# Patient Record
Sex: FEMALE | Race: BLACK OR AFRICAN AMERICAN | HISPANIC OR LATINO | URBAN - METROPOLITAN AREA
[De-identification: names, ages, dates, MRNs, and addresses within clinical notes are randomized per-mention and may not be internally consistent; named-entity substitution may affect disease eponyms.]

---

## 2023-08-30 ENCOUNTER — OFFICE VISIT (OUTPATIENT)
Dept: OBGYN CLINIC | Facility: CLINIC | Age: 43
End: 2023-08-30

## 2023-08-30 VITALS
BODY MASS INDEX: 27.88 KG/M2 | HEART RATE: 73 BPM | SYSTOLIC BLOOD PRESSURE: 112 MMHG | HEIGHT: 60 IN | WEIGHT: 142 LBS | DIASTOLIC BLOOD PRESSURE: 72 MMHG

## 2023-08-30 DIAGNOSIS — Z12.4 CERVICAL CANCER SCREENING: ICD-10-CM

## 2023-08-30 DIAGNOSIS — N94.9 VAGINAL BURNING: ICD-10-CM

## 2023-08-30 DIAGNOSIS — Z12.39 ENCOUNTER FOR SCREENING FOR MALIGNANT NEOPLASM OF BREAST, UNSPECIFIED SCREENING MODALITY: ICD-10-CM

## 2023-08-30 DIAGNOSIS — Z01.419 ENCOUNTER FOR GYNECOLOGICAL EXAMINATION (GENERAL) (ROUTINE) WITHOUT ABNORMAL FINDINGS: Primary | ICD-10-CM

## 2023-08-30 PROCEDURE — G0476 HPV COMBO ASSAY CA SCREEN: HCPCS | Performed by: OBSTETRICS & GYNECOLOGY

## 2023-08-30 PROCEDURE — G0124 SCREEN C/V THIN LAYER BY MD: HCPCS | Performed by: PATHOLOGY

## 2023-08-30 PROCEDURE — 99386 PREV VISIT NEW AGE 40-64: CPT | Performed by: OBSTETRICS & GYNECOLOGY

## 2023-08-30 PROCEDURE — G0145 SCR C/V CYTO,THINLAYER,RESCR: HCPCS | Performed by: PATHOLOGY

## 2023-08-31 LAB
HPV HR 12 DNA CVX QL NAA+PROBE: NEGATIVE
HPV16 DNA CVX QL NAA+PROBE: NEGATIVE
HPV18 DNA CVX QL NAA+PROBE: NEGATIVE

## 2023-09-08 LAB
LAB AP GYN PRIMARY INTERPRETATION: ABNORMAL
Lab: ABNORMAL
PATH INTERP SPEC-IMP: ABNORMAL

## 2023-10-24 ENCOUNTER — TELEPHONE (OUTPATIENT)
Dept: OBGYN CLINIC | Facility: CLINIC | Age: 43
End: 2023-10-24

## 2023-10-24 NOTE — TELEPHONE ENCOUNTER
----- Message from Suzanne Hamilton sent at 10/10/2023  8:40 AM EDT -----  Can you please let the pt know. Thank you!   ----- Message -----  From: Carolina Aviles MD  Sent: 9/22/2023   9:15 AM EDT  To: Suzanne Hamilton    Hi    Her pap is ASCUS with NEGATIVE HPV so she will need a pap in 3 years. TY  ----- Message -----  From: Suzanne Hamilton  Sent: 9/15/2023   2:05 PM EDT  To: Carolina Aviles MD    Fyi pap result.

## 2023-11-07 ENCOUNTER — TELEPHONE (OUTPATIENT)
Dept: OBGYN CLINIC | Facility: CLINIC | Age: 43
End: 2023-11-07

## 2023-11-09 ENCOUNTER — TELEPHONE (OUTPATIENT)
Dept: OBGYN CLINIC | Facility: CLINIC | Age: 43
End: 2023-11-09

## 2023-11-09 NOTE — TELEPHONE ENCOUNTER
----- Message from Lyric Ortez sent at 10/10/2023  8:40 AM EDT -----  Can you please let the pt know. Thank you!   ----- Message -----  From: Poly Tinsley MD  Sent: 9/22/2023   9:15 AM EDT  To: Lyric Neelima    Hi    Her pap is ASCUS with NEGATIVE HPV so she will need a pap in 3 years. TY  ----- Message -----  From: Lyric Ortez  Sent: 9/15/2023   2:05 PM EDT  To: Poly Tinsley MD    Fyi pap result.

## 2023-11-13 ENCOUNTER — TELEPHONE (OUTPATIENT)
Dept: OBGYN CLINIC | Facility: CLINIC | Age: 43
End: 2023-11-13

## 2023-11-17 ENCOUNTER — TELEPHONE (OUTPATIENT)
Dept: OTHER | Facility: OTHER | Age: 43
End: 2023-11-17

## 2023-11-17 NOTE — TELEPHONE ENCOUNTER
Pt would like a call back to go over lab results.  She would also like to schedule an appointment for a mammogram.

## 2023-11-18 ENCOUNTER — TELEPHONE (OUTPATIENT)
Dept: OBGYN CLINIC | Facility: CLINIC | Age: 43
End: 2023-11-18

## 2023-12-15 ENCOUNTER — HOSPITAL ENCOUNTER (OUTPATIENT)
Dept: MAMMOGRAPHY | Facility: MEDICAL CENTER | Age: 43
Discharge: HOME | End: 2023-12-15
Payer: COMMERCIAL

## 2023-12-15 VITALS — HEIGHT: 60 IN | WEIGHT: 141.98 LBS | BODY MASS INDEX: 27.87 KG/M2

## 2023-12-15 DIAGNOSIS — Z12.39 ENCOUNTER FOR SCREENING FOR MALIGNANT NEOPLASM OF BREAST, UNSPECIFIED SCREENING MODALITY: ICD-10-CM

## 2023-12-15 DIAGNOSIS — Z12.31 ENCOUNTER FOR SCREENING MAMMOGRAM FOR MALIGNANT NEOPLASM OF BREAST: ICD-10-CM

## 2023-12-15 PROCEDURE — 77067 SCR MAMMO BI INCL CAD: CPT

## 2023-12-15 PROCEDURE — 77063 BREAST TOMOSYNTHESIS BI: CPT

## 2024-01-24 ENCOUNTER — TELEPHONE (OUTPATIENT)
Dept: MAMMOGRAPHY | Facility: CLINIC | Age: 44
End: 2024-01-24

## 2024-01-24 NOTE — TELEPHONE ENCOUNTER
We were unsuccessful in contacting the above patient for her diagnostic follow up mammogram/ultrasound.  I have left messages for her on 1/9 and 1/16 with no response to schedule.      Please attempt to contact this patient and have them schedule their appointment.  Please let me know if you have any questions or if I can be of any further assistance.    Thank you,  Anitha Becerril RN, BSN  Breast Nurse Navigator

## 2024-01-31 ENCOUNTER — OCCMED (OUTPATIENT)
Dept: URGENT CARE | Facility: MEDICAL CENTER | Age: 44
End: 2024-01-31
Payer: OTHER MISCELLANEOUS

## 2024-01-31 ENCOUNTER — APPOINTMENT (OUTPATIENT)
Dept: RADIOLOGY | Facility: MEDICAL CENTER | Age: 44
End: 2024-01-31
Payer: OTHER MISCELLANEOUS

## 2024-01-31 DIAGNOSIS — M54.42 ACUTE BILATERAL LOW BACK PAIN WITH LEFT-SIDED SCIATICA: Primary | ICD-10-CM

## 2024-01-31 DIAGNOSIS — M54.42 ACUTE BILATERAL LOW BACK PAIN WITH LEFT-SIDED SCIATICA: ICD-10-CM

## 2024-01-31 PROCEDURE — G0383 LEV 4 HOSP TYPE B ED VISIT: HCPCS

## 2024-01-31 PROCEDURE — 99284 EMERGENCY DEPT VISIT MOD MDM: CPT

## 2024-01-31 PROCEDURE — 72100 X-RAY EXAM L-S SPINE 2/3 VWS: CPT

## 2024-02-07 ENCOUNTER — APPOINTMENT (OUTPATIENT)
Dept: URGENT CARE | Facility: MEDICAL CENTER | Age: 44
End: 2024-02-07
Payer: OTHER MISCELLANEOUS

## 2024-02-07 PROCEDURE — 99213 OFFICE O/P EST LOW 20 MIN: CPT | Performed by: PHYSICIAN ASSISTANT

## 2024-11-13 ENCOUNTER — ANNUAL EXAM (OUTPATIENT)
Dept: OBGYN CLINIC | Facility: CLINIC | Age: 44
End: 2024-11-13

## 2024-11-13 VITALS
HEIGHT: 60 IN | DIASTOLIC BLOOD PRESSURE: 69 MMHG | WEIGHT: 144.2 LBS | BODY MASS INDEX: 28.31 KG/M2 | SYSTOLIC BLOOD PRESSURE: 101 MMHG | HEART RATE: 88 BPM

## 2024-11-13 DIAGNOSIS — Z01.419 ENCOUNTER FOR ANNUAL ROUTINE GYNECOLOGICAL EXAMINATION: Primary | ICD-10-CM

## 2024-11-13 DIAGNOSIS — R92.8 ABNORMAL MAMMOGRAM: ICD-10-CM

## 2024-11-13 PROCEDURE — S0612 ANNUAL GYNECOLOGICAL EXAMINA: HCPCS | Performed by: NURSE PRACTITIONER

## 2024-11-13 NOTE — PROGRESS NOTES
Annual Exam    Assessment   1. Encounter for annual routine gynecological examination        2. Abnormal mammogram  Mammo diagnostic bilateral w 3d and cad    US BREAST BILATERAL LIMITED (DIAGNOSTIC)        well woman       Plan       All questions answered.  Breast self exam technique reviewed and patient encouraged to perform self-exam monthly.  Contraception: tubal ligation.  Discussed healthy lifestyle modifications.  Follow up in 1 year.  Mammogram.     Patient Instructions   Schedule Diagnostic mammogram and bilateral breast U/S  Call with needds or concerns  Return in 1 year  Pt verbalized understanding of all discussed.      Subjective      Akilah Larsen is a 44 y.o.  female who presents for annual well woman exam. Periods are not occurring  x 1 1/2 years, lasting 0 days. No intermenstrual bleeding, spotting, or discharge.   ASCUS HPV negative   1 partner x 20 years, denies domestic violence  Discussed early menopause and importance of calcium, vitamin D and calcium to decrease the risk of bone Fx    Depression Screening Follow-up Plan: Patient's depression screening was negative with a PHQ-2 score of 0. Their PHQ-9 score was 0. Clinically patient does not have depression. No treatment is required.      Current contraception: tubal ligation  History of abnormal Pap smear: yes - ASCUS, HPV negative   Family history of uterine or ovarian cancer: no  Regular self breast exam: yes  History of abnormal mammogram: yes - Asymmetry  Family history of breast cancer: no  History of abnormal lipids: unknown  Menstrual History:  OB History          3    Para   3    Term   3            AB        Living   4         SAB        IAB        Ectopic        Multiple        Live Births   4                Menarche age: 12  No LMP recorded. 1 1/2 years ago       The following portions of the patient's history were reviewed and updated as appropriate: allergies, current medications, past family  Pt's daughter Daija called for PT orders from March. Needs to be faxed but she doesn't know where to go. [phone]   history, past medical history, past social history, past surgical history and problem list.    Review of Systems  Pertinent items are noted in HPI.      Objective      /69 (BP Location: Left arm, Patient Position: Sitting, Cuff Size: Standard)   Pulse 88   Ht 5' (1.524 m)   Wt 65.4 kg (144 lb 3.2 oz)   BMI 28.16 kg/m²     General: alert and oriented, in no acute distress, alert, appears stated age, and cooperative   Heart: NSR   Lungs: clear to auscultation bilaterally, WNL respiratory effort, negative cough or SOB   Thyroid: Negative masses palpable   Abdomen: soft, non-tender, without masses or organomegaly   Vulva: normal   Vagina: normal mucosa   Cervix: no cervical motion tenderness and no lesions   Uterus: normal size, non-tender, normal shape and consistency   Adnexa: normal adnexa   Urethra: normal   Breasts: NT,negative masses palpable, (asymmetry on 8.2023 Mammogram), negative discharge, or dimpling

## 2024-11-13 NOTE — PATIENT INSTRUCTIONS
Schedule Diagnostic mammogram and bilateral breast U/S  Call with needds or concerns  Return in 1 year